# Patient Record
Sex: MALE | Race: WHITE | NOT HISPANIC OR LATINO
[De-identification: names, ages, dates, MRNs, and addresses within clinical notes are randomized per-mention and may not be internally consistent; named-entity substitution may affect disease eponyms.]

---

## 2018-08-08 ENCOUNTER — HOSPITAL (OUTPATIENT)
Dept: OTHER | Age: 53
End: 2018-08-08
Attending: EMERGENCY MEDICINE

## 2021-05-13 ENCOUNTER — OFFICE VISIT (OUTPATIENT)
Dept: URGENT CARE | Facility: URGENT CARE | Age: 56
End: 2021-05-13
Payer: COMMERCIAL

## 2021-05-13 VITALS
OXYGEN SATURATION: 98 % | WEIGHT: 170 LBS | BODY MASS INDEX: 23.8 KG/M2 | HEIGHT: 71 IN | DIASTOLIC BLOOD PRESSURE: 87 MMHG | RESPIRATION RATE: 16 BRPM | TEMPERATURE: 98.5 F | SYSTOLIC BLOOD PRESSURE: 129 MMHG | HEART RATE: 97 BPM

## 2021-05-13 DIAGNOSIS — H00.015 HORDEOLUM EXTERNUM OF LEFT LOWER EYELID: Primary | ICD-10-CM

## 2021-05-13 PROCEDURE — 99203 OFFICE O/P NEW LOW 30 MIN: CPT | Performed by: PHYSICIAN ASSISTANT

## 2021-05-13 RX ORDER — ERYTHROMYCIN 5 MG/G
0.5 OINTMENT OPHTHALMIC AT BEDTIME
COMMUNITY
End: 2021-12-15

## 2021-05-13 RX ORDER — TOBRAMYCIN AND DEXAMETHASONE 3; 1 MG/ML; MG/ML
1-2 SUSPENSION/ DROPS OPHTHALMIC EVERY 4 HOURS
Qty: 10 ML | Refills: 0 | Status: SHIPPED | OUTPATIENT
Start: 2021-05-13 | End: 2021-12-15

## 2021-05-13 ASSESSMENT — MIFFLIN-ST. JEOR: SCORE: 1618.24

## 2021-05-13 NOTE — PATIENT INSTRUCTIONS
Patient Education     Sty (or Stye)  A sty is when the oil gland of the eyelid becomes inflamed. It may develop into an infection with a small pocket of pus (an abscess). This can cause pain, redness, and swelling. In early stages, a sty is treated with antibiotic cream, eye drops, or a small towel soaked in warm water (a warm compress). More severe cases may need to be opened and drained by a healthcare provider.   Home care    Eye drops or ointment are often prescribed to treat the infection. Use these as directed.     Artificial tears may also be used to lubricate the eye and make it more comfortable. You can buy these over the counter without a prescription. Talk with your healthcare provider before using any over-the-counter treatment for a sty.    Apply a warm, damp towel to the affected area for at least 5 minutes, 3 to 4 times a day for a week. Warm compresses open the pores and speed the healing. Make sure the compresses are not too hot, as they may burn your eyelid.    Sometimes the sty will drain with this treatment alone. If this happens, keep using the antibiotic until all the redness and swelling are gone.    Wash your hands before and after touching the infected eyelid.    Don t squeeze or try to break open the sty.    Follow-up care  Follow up with your healthcare provider, or as advised.   When to seek medical advice  Call your healthcare provider or seek medical care right away if any of these occur:     Increase in swelling or redness around the eyelid after 48 to 72 hours    Increase in eye pain or the eyelid blisters    Increase in warmth--the eyelid feels hot    Drainage of blood or thick pus from the sty    Blister on the eyelid    Inability to open the eyelid due to swelling    Fever of 100.4 F (38 C) or above, or as directed by your provider    Vision changes    Headache or stiff neck    The sty comes back  [employer] last reviewed this educational content on 6/1/2020 2000-2021 [employer]. All rights reserved. This information is not intended as a substitute for professional medical care. Always follow your healthcare professional's instructions.

## 2021-05-13 NOTE — PROGRESS NOTES
Hordeolum externum of left lower eyelid  - tobramycin-dexamethasone (TOBRADEX) 0.3-0.1 % ophthalmic suspension; Place 1-2 drops Into the left eye every 4 hours    20 minutes spent on the date of the encounter doing chart review, history and exam, documentation and further activities per the note     See Patient Instructions  Patient Instructions     Patient Education     Sty (or Stye)  A sty is when the oil gland of the eyelid becomes inflamed. It may develop into an infection with a small pocket of pus (an abscess). This can cause pain, redness, and swelling. In early stages, a sty is treated with antibiotic cream, eye drops, or a small towel soaked in warm water (a warm compress). More severe cases may need to be opened and drained by a healthcare provider.   Home care    Eye drops or ointment are often prescribed to treat the infection. Use these as directed.     Artificial tears may also be used to lubricate the eye and make it more comfortable. You can buy these over the counter without a prescription. Talk with your healthcare provider before using any over-the-counter treatment for a sty.    Apply a warm, damp towel to the affected area for at least 5 minutes, 3 to 4 times a day for a week. Warm compresses open the pores and speed the healing. Make sure the compresses are not too hot, as they may burn your eyelid.    Sometimes the sty will drain with this treatment alone. If this happens, keep using the antibiotic until all the redness and swelling are gone.    Wash your hands before and after touching the infected eyelid.    Don t squeeze or try to break open the sty.    Follow-up care  Follow up with your healthcare provider, or as advised.   When to seek medical advice  Call your healthcare provider or seek medical care right away if any of these occur:     Increase in swelling or redness around the eyelid after 48 to 72 hours    Increase in eye pain or the eyelid blisters    Increase in warmth--the  eyelid feels hot    Drainage of blood or thick pus from the sty    Blister on the eyelid    Inability to open the eyelid due to swelling    Fever of 100.4 F (38 C) or above, or as directed by your provider    Vision changes    Headache or stiff neck    The sty comes back  [employer] last reviewed this educational content on 6/1/2020 2000-2021 [employer]. All rights reserved. This information is not intended as a substitute for professional medical care. Always follow your healthcare professional's instructions.               Jagdish Narayanan PA-C  [facility]    Subjective   56 year old year old who presents to clinic today for the following health issues:    Urgent Care and Eye Problem       HPI     Eye(s) Problem  Onset/Duration: 4 days ago  Description:   Location: Left eye bottom lid  Pain: YES  Redness: YES  Accompanying Signs & Symptoms:  Discharge/mattering: YES  Swelling: YES  Visual changes: no  Fever: no  Nasal Congestion: no  Bothered by bright lights: no  History:  Trauma: no  Foreign body exposure: no  Wearing contacts: no  Precipitating or alleviating factors: None  Therapies tried and outcome: None      Review of Systems   Review of Systems   See HPI     Objective    Temp: 98.5  F (36.9  C)   BP: 129/87 Pulse: 97   Resp: 16 SpO2: 98 %       Physical Exam   Physical Exam  Constitutional:       General: He is not in acute distress.     Appearance: Normal appearance. He is normal weight. He is not ill-appearing or toxic-appearing.   HENT:      Head: Normocephalic and atraumatic.      Right Ear: Tympanic membrane, ear canal and external ear normal. There is no impacted cerumen.      Left Ear: Tympanic membrane, ear canal and external ear normal. There is no impacted cerumen.      Nose: Nose normal. No congestion or rhinorrhea.      Mouth/Throat:      Mouth: Mucous membranes are moist.      Pharynx: Oropharynx is clear. No oropharyngeal exudate or posterior oropharyngeal  erythema.   Eyes:      General: No allergic shiner.        Right eye: No foreign body, discharge or hordeolum.         Left eye: Discharge and hordeolum present.No foreign body.      Extraocular Movements: Extraocular movements intact.      Conjunctiva/sclera: Conjunctivae normal.      Pupils: Pupils are equal, round, and reactive to light.     Neck:      Musculoskeletal: Normal range of motion. No muscular tenderness.   Cardiovascular:      Rate and Rhythm: Normal rate and regular rhythm.      Pulses: Normal pulses.      Heart sounds: Normal heart sounds. No murmur. No friction rub. No gallop.    Pulmonary:      Effort: Pulmonary effort is normal. No respiratory distress.      Breath sounds: Normal breath sounds. No stridor. No wheezing, rhonchi or rales.   Chest:      Chest wall: No tenderness.   Lymphadenopathy:      Cervical: No cervical adenopathy.   Neurological:      General: No focal deficit present.      Mental Status: He is alert and oriented to person, place, and time. Mental status is at baseline.      Gait: Gait normal.   Psychiatric:         Mood and Affect: Mood normal.         Behavior: Behavior normal.         Thought Content: Thought content normal.         Judgment: Judgment normal.

## 2021-05-17 NOTE — TELEPHONE ENCOUNTER
FUTURE VISIT INFORMATION      FUTURE VISIT INFORMATION:    Date: 5/18/21    Time: 2:00pm    [place] [facility] [place]  REFERRAL INFORMATION:    Referring provider:  self    Referring providers clinic:  N/A    Reason for visit/diagnosis  Chalazion Consult    RECORDS REQUESTED FROM:       No recs to collect per Pt

## 2021-05-18 ENCOUNTER — OFFICE VISIT (OUTPATIENT)
Dept: OPHTHALMOLOGY | Facility: CLINIC | Age: 56
End: 2021-05-18
Payer: COMMERCIAL

## 2021-05-18 ENCOUNTER — PRE VISIT (OUTPATIENT)
Dept: OPHTHALMOLOGY | Facility: CLINIC | Age: 56
End: 2021-05-18

## 2021-05-18 DIAGNOSIS — H00.15 CHALAZION OF LEFT LOWER EYELID: Primary | ICD-10-CM

## 2021-05-18 PROCEDURE — 99203 OFFICE O/P NEW LOW 30 MIN: CPT | Mod: 25 | Performed by: OPHTHALMOLOGY

## 2021-05-18 PROCEDURE — 67800 REMOVE EYELID LESION: CPT | Mod: E2 | Performed by: OPHTHALMOLOGY

## 2021-05-18 RX ORDER — ERYTHROMYCIN 5 MG/G
OINTMENT OPHTHALMIC ONCE
Status: COMPLETED | OUTPATIENT
Start: 2021-05-18 | End: 2021-05-18

## 2021-05-18 RX ADMIN — ERYTHROMYCIN: 5 OINTMENT OPHTHALMIC at 15:07

## 2021-05-18 ASSESSMENT — TONOMETRY
IOP_METHOD: ICARE
OS_IOP_MMHG: 14
OD_IOP_MMHG: 13

## 2021-05-18 ASSESSMENT — CONF VISUAL FIELD
OS_NORMAL: 1
OD_NORMAL: 1

## 2021-05-18 ASSESSMENT — VISUAL ACUITY
METHOD: SNELLEN - LINEAR
CORRECTION_TYPE: GLASSES
OD_CC: 20/20
OS_CC: 20/30

## 2021-05-18 NOTE — PROGRESS NOTES
"Oculoplastic Clinic New Patient    Patient: Juan J Turner MRN# [id number]   YOB: 1965 Age: 56 year old   Date of Visit: May 18, 2021    CC: Eyelid lesion    Chief Complaint(s) and History of Present Illness(es)     Chalazion Evaluation     Laterality: left lower lid    Onset: 2 weeks ago    Associated symptoms: lid swelling, lid pain, lid redness, discharge and   mattering.  Negative for eye pain and bleeding    Treatments tried: warm compresses and antibiotic drops    Response to treatment: no improvement        Comments     Pt notes that he has a h/o chalazions always in the LE (4 times before),   this time is the bottom lid, started getting bigger on the outside of the   LLL x 2 days ago, had discharge and the bump \"popped\" in the inside on   Sunday. Has some \"mild, sharp pain\" when blinking in the LE. Has been   using WC x 2 weeks, QID for 10 minutes, has the \"one that you heat up in   the microwave\" per pt.  Pt went to [facility] and received tobramycin/dex QID LE.     Jenny Hancock COT May 18, 2021 2:39 PM          HPI:     Juan J Turner is a 56 year old male who has noted a lesion on the left lower eyelid. It has been present for last 10 days. Similar to previous chalazions on the left lower eyelid. It appears red, swollen, and did drain like an abscess approximately 2 days ago. No further drainage and appears to have developed again. Mild pain with palpation and forceful blinking. Not obstructing vision at this time. This has not been biopsied. He has had previous chalazion drained with resolution. He also has had previous steroid injection with some relief. Has been performing warm compresses.     Enlarging: Yes  Irritating to eyelashes and catches in folds of skin: No  Bleeding: No  Prior cutaneous malignancy: No  Immunosuppression: No, no medications  No blood thinners.            Assessment and Plan:     Encounter Diagnosis   Name Primary?     Chalazion of left lower eyelid Yes "   Discussed options, plan incision and drainage today.    OPERATIVE NOTE: CHALAZION.    PRE-OPERATIVE DIAGNOSIS: Chalazion left lower lid.    POST-OPERATIVE DIAGNOSIS: Chalazion left lower lid.    PROCEDURE PERFORMED: Incision and drainage of chalazion left lower lid.    SURGEON: Arianne Morrow    ANESTHESIA: Local infiltration with 2% lidocaine with epinephrine.    COMPLICATIONS: None    ESTIMATED BLOOD LOSS:  <5mL    HISTORY AND INDICATIONS: Patient presented with an enlarging chalazion in the involved eyelid.  This failed conservative medical management.  After the risks, benefits and alternatives of the proposed procedure were explained, informed consent was obtained.     PROCEDURE: Patient was brought to the minor procedure room and placed supine on the operating table.  Anesthesia was as listed above.  The area was prepped and draped in the typical fashion.  A chalazion clamp was placed over the involved eyelid and the eyelid everted.  A crutiate incision was made over the chalazion and the lipogranulomatous material removed using the chalazion curette.  Scissors were used to break any septae.  The edges of the cruciate incision were excised using Mustapha scissors.  Hemostasis was obtained.  The lid was reverted to its normal position, the clamp removed, and the erythromycin antibiotic ointment applied.  The patient tolerated the procedure well and left in stable condition with a tube of antibiotic ointment.     I was present for the entire procedure. Arianne Morrow MD          Attending Physician Attestation: Complete documentation of historical and exam elements from today's encounter can be found in the full encounter summary report (not reduplicated in this progress note). I personally obtained the chief complaint(s) and history of present illness. I confirmed and edited as necessary the review of systems, past medical/surgical history, family history, social history, and examination findings as  documented by others; and I examined the patient myself. I personally reviewed the relevant tests, images, and reports as documented above. I formulated and edited as necessary the assessment and plan and discussed the findings and management plan with the patient. Arianne Morrow MD    Today with Juan J Turner, I reviewed the indications, risks, benefits, and alternatives of the proposed surgical procedure including, but not limited to, failure obtain the desired result  and need for additional surgery, bleeding, infection, loss of vision, injury to the eye, and the remote possibility of permanent damage to any organ system or death with the use of anesthesia.  I provided multiple opportunities for the questions, answered all questions to the best of my ability, and confirmed that my answers and my discussion were understood. Arianne Morrow MD

## 2021-05-18 NOTE — LETTER
"5/18/2021       RE: Juan J Turner  [address]  [place] [facility] [address]     Dear Colleague,    Thank you for referring your patient, Juan J Turner, to the [facility] [place] [facility]. Please see a copy of my visit note below.    Oculoplastic Clinic New Patient    Patient: Juan J Turner MRN# [id number]   YOB: 1965 Age: 56 year old   Date of Visit: May 18, 2021    CC: Eyelid lesion    Chief Complaint(s) and History of Present Illness(es)     Chalazion Evaluation     Laterality: left lower lid    Onset: 2 weeks ago    Associated symptoms: lid swelling, lid pain, lid redness, discharge and   mattering.  Negative for eye pain and bleeding    Treatments tried: warm compresses and antibiotic drops    Response to treatment: no improvement        Comments     Pt notes that he has a h/o chalazions always in the LE (4 times before),   this time is the bottom lid, started getting bigger on the outside of the   LLL x 2 days ago, had discharge and the bump \"popped\" in the inside on   Sunday. Has some \"mild, sharp pain\" when blinking in the LE. Has been   using WC x 2 weeks, QID for 10 minutes, has the \"one that you heat up in   the microwave\" per pt.  Pt went to [facility] and received tobramycin/dex QID LE.     Jenny Hancock COT May 18, 2021 2:39 PM          HPI:     Juan J Turner is a 56 year old male who has noted a lesion on the left lower eyelid. It has been present for last 10 days. Similar to previous chalazions on the left lower eyelid. It appears red, swollen, and did drain like an abscess approximately 2 days ago. No further drainage and appears to have developed again. Mild pain with palpation and forceful blinking. Not obstructing vision at this time. This has not been biopsied. He has had previous chalazion drained with resolution. He also has had previous steroid injection with some relief. Has been " performing warm compresses.     Enlarging: Yes  Irritating to eyelashes and catches in folds of skin: No  Bleeding: No  Prior cutaneous malignancy: No  Immunosuppression: No, no medications  No blood thinners.            Assessment and Plan:     Encounter Diagnosis   Name Primary?     Chalazion of left lower eyelid Yes   Discussed options, plan incision and drainage today.    OPERATIVE NOTE: CHALAZION.    PRE-OPERATIVE DIAGNOSIS: Chalazion left lower lid.    POST-OPERATIVE DIAGNOSIS: Chalazion left lower lid.    PROCEDURE PERFORMED: Incision and drainage of chalazion left lower lid.    SURGEON: Arianne Morrow    ANESTHESIA: Local infiltration with 2% lidocaine with epinephrine.    COMPLICATIONS: None    ESTIMATED BLOOD LOSS:  <5mL    HISTORY AND INDICATIONS: Patient presented with an enlarging chalazion in the involved eyelid.  This failed conservative medical management.  After the risks, benefits and alternatives of the proposed procedure were explained, informed consent was obtained.     PROCEDURE: Patient was brought to the minor procedure room and placed supine on the operating table.  Anesthesia was as listed above.  The area was prepped and draped in the typical fashion.  A chalazion clamp was placed over the involved eyelid and the eyelid everted.  A crutiate incision was made over the chalazion and the lipogranulomatous material removed using the chalazion curette.  Scissors were used to break any septae.  The edges of the cruciate incision were excised using Mustapha scissors.  Hemostasis was obtained.  The lid was reverted to its normal position, the clamp removed, and the erythromycin antibiotic ointment applied.  The patient tolerated the procedure well and left in stable condition with a tube of antibiotic ointment.     I was present for the entire procedure. Arianne Morrow MD          Attending Physician Attestation: Complete documentation of historical and exam elements from today's encounter  can be found in the full encounter summary report (not reduplicated in this progress note). I personally obtained the chief complaint(s) and history of present illness. I confirmed and edited as necessary the review of systems, past medical/surgical history, family history, social history, and examination findings as documented by others; and I examined the patient myself. I personally reviewed the relevant tests, images, and reports as documented above. I formulated and edited as necessary the assessment and plan and discussed the findings and management plan with the patient. Arianne Morrow MD    Today with Juan J Turner, I reviewed the indications, risks, benefits, and alternatives of the proposed surgical procedure including, but not limited to, failure obtain the desired result  and need for additional surgery, bleeding, infection, loss of vision, injury to the eye, and the remote possibility of permanent damage to any organ system or death with the use of anesthesia.  I provided multiple opportunities for the questions, answered all questions to the best of my ability, and confirmed that my answers and my discussion were understood. Arianne Morrow MD        Again, thank you for allowing me to participate in the care of your patient.      Sincerely,    Arianne Morrow MD

## 2021-05-18 NOTE — NURSING NOTE
"Chief Complaints and History of Present Illnesses   Patient presents with     Chalazion Evaluation     Chief Complaint(s) and History of Present Illness(es)     Chalazion Evaluation     Laterality: left lower lid    Onset: 2 weeks ago    Associated symptoms: lid swelling, lid pain, lid redness, discharge and mattering.  Negative for eye pain and bleeding    Treatments tried: warm compresses and antibiotic drops    Response to treatment: no improvement              Comments     Pt notes that he has a h/o chalazions always in the LE (4 times before), this time is the bottom lid, started getting bigger on the outside of the LLL x 2 days ago, had discharge and the bump \"popped\" in the inside on Sunday. Has some \"mild, sharp pain\" when blinking in the LE. Has been using WC x 2 weeks, QID for 10 minutes, has the \"one that you heat up in the microwave\" per pt.  Pt went to [facility] and received tobramycin/dex QID LE.     Jenny Hancock COT May 18, 2021 2:39 PM                  "

## 2021-06-27 ENCOUNTER — HEALTH MAINTENANCE LETTER (OUTPATIENT)
Age: 56
End: 2021-06-27

## 2021-10-17 ENCOUNTER — HEALTH MAINTENANCE LETTER (OUTPATIENT)
Age: 56
End: 2021-10-17

## 2021-11-09 ENCOUNTER — OFFICE VISIT (OUTPATIENT)
Dept: FAMILY MEDICINE | Facility: CLINIC | Age: 56
End: 2021-11-09
Payer: COMMERCIAL

## 2021-11-09 VITALS
OXYGEN SATURATION: 98 % | SYSTOLIC BLOOD PRESSURE: 124 MMHG | TEMPERATURE: 97.7 F | HEIGHT: 71 IN | RESPIRATION RATE: 16 BRPM | WEIGHT: 175 LBS | HEART RATE: 66 BPM | BODY MASS INDEX: 24.5 KG/M2 | DIASTOLIC BLOOD PRESSURE: 76 MMHG

## 2021-11-09 DIAGNOSIS — Z80.42 FAMILY HISTORY OF PROSTATE CANCER IN FATHER: ICD-10-CM

## 2021-11-09 DIAGNOSIS — R53.83 OTHER FATIGUE: ICD-10-CM

## 2021-11-09 DIAGNOSIS — Z23 NEED FOR VACCINATION: ICD-10-CM

## 2021-11-09 DIAGNOSIS — Z12.5 SCREENING FOR PROSTATE CANCER: ICD-10-CM

## 2021-11-09 DIAGNOSIS — Z11.4 SCREENING FOR HIV (HUMAN IMMUNODEFICIENCY VIRUS): ICD-10-CM

## 2021-11-09 DIAGNOSIS — R39.11 URINARY HESITANCY: Primary | ICD-10-CM

## 2021-11-09 DIAGNOSIS — Z11.59 NEED FOR HEPATITIS C SCREENING TEST: ICD-10-CM

## 2021-11-09 DIAGNOSIS — Z23 NEED FOR PROPHYLACTIC VACCINATION AND INOCULATION AGAINST INFLUENZA: ICD-10-CM

## 2021-11-09 LAB
ALBUMIN SERPL-MCNC: 3.8 G/DL (ref 3.4–5)
ALP SERPL-CCNC: 117 U/L (ref 40–150)
ALT SERPL W P-5'-P-CCNC: 82 U/L (ref 0–70)
ANION GAP SERPL CALCULATED.3IONS-SCNC: 8 MMOL/L (ref 3–14)
AST SERPL W P-5'-P-CCNC: 47 U/L (ref 0–45)
BASOPHILS # BLD AUTO: 0 10E3/UL (ref 0–0.2)
BASOPHILS NFR BLD AUTO: 1 %
BILIRUB SERPL-MCNC: 0.7 MG/DL (ref 0.2–1.3)
BUN SERPL-MCNC: 12 MG/DL (ref 7–30)
CALCIUM SERPL-MCNC: 9 MG/DL (ref 8.5–10.1)
CHLORIDE BLD-SCNC: 104 MMOL/L (ref 94–109)
CO2 SERPL-SCNC: 25 MMOL/L (ref 20–32)
CREAT SERPL-MCNC: 0.81 MG/DL (ref 0.66–1.25)
DEPRECATED CALCIDIOL+CALCIFEROL SERPL-MC: 30 UG/L (ref 20–75)
EOSINOPHIL # BLD AUTO: 0.1 10E3/UL (ref 0–0.7)
EOSINOPHIL NFR BLD AUTO: 2 %
ERYTHROCYTE [DISTWIDTH] IN BLOOD BY AUTOMATED COUNT: 12.7 % (ref 10–15)
FERRITIN SERPL-MCNC: 104 NG/ML (ref 26–388)
GFR SERPL CREATININE-BSD FRML MDRD: >90 ML/MIN/1.73M2
GLUCOSE BLD-MCNC: 106 MG/DL (ref 70–99)
HBA1C MFR BLD: 5.3 % (ref 0–5.6)
HCT VFR BLD AUTO: 50.8 % (ref 40–53)
HCV AB SERPL QL IA: NONREACTIVE
HGB BLD-MCNC: 17.1 G/DL (ref 13.3–17.7)
HIV 1+2 AB+HIV1 P24 AG SERPL QL IA: NONREACTIVE
LYMPHOCYTES # BLD AUTO: 1.1 10E3/UL (ref 0.8–5.3)
LYMPHOCYTES NFR BLD AUTO: 29 %
MCH RBC QN AUTO: 31.1 PG (ref 26.5–33)
MCHC RBC AUTO-ENTMCNC: 33.7 G/DL (ref 31.5–36.5)
MCV RBC AUTO: 93 FL (ref 78–100)
MONOCYTES # BLD AUTO: 0.4 10E3/UL (ref 0–1.3)
MONOCYTES NFR BLD AUTO: 10 %
NEUTROPHILS # BLD AUTO: 2.3 10E3/UL (ref 1.6–8.3)
NEUTROPHILS NFR BLD AUTO: 58 %
PLATELET # BLD AUTO: 284 10E3/UL (ref 150–450)
POTASSIUM BLD-SCNC: 4.4 MMOL/L (ref 3.4–5.3)
PROT SERPL-MCNC: 7.8 G/DL (ref 6.8–8.8)
PSA SERPL-MCNC: 3.58 UG/L (ref 0–4)
RBC # BLD AUTO: 5.49 10E6/UL (ref 4.4–5.9)
SODIUM SERPL-SCNC: 137 MMOL/L (ref 133–144)
TSH SERPL DL<=0.005 MIU/L-ACNC: 0.96 MU/L (ref 0.4–4)
VIT B12 SERPL-MCNC: 269 PG/ML (ref 193–986)
WBC # BLD AUTO: 3.9 10E3/UL (ref 4–11)

## 2021-11-09 PROCEDURE — 90472 IMMUNIZATION ADMIN EACH ADD: CPT | Performed by: INTERNAL MEDICINE

## 2021-11-09 PROCEDURE — 82607 VITAMIN B-12: CPT | Performed by: INTERNAL MEDICINE

## 2021-11-09 PROCEDURE — 83036 HEMOGLOBIN GLYCOSYLATED A1C: CPT | Performed by: INTERNAL MEDICINE

## 2021-11-09 PROCEDURE — 90471 IMMUNIZATION ADMIN: CPT | Performed by: INTERNAL MEDICINE

## 2021-11-09 PROCEDURE — 90682 RIV4 VACC RECOMBINANT DNA IM: CPT | Performed by: INTERNAL MEDICINE

## 2021-11-09 PROCEDURE — 86803 HEPATITIS C AB TEST: CPT | Performed by: INTERNAL MEDICINE

## 2021-11-09 PROCEDURE — 36415 COLL VENOUS BLD VENIPUNCTURE: CPT | Performed by: INTERNAL MEDICINE

## 2021-11-09 PROCEDURE — 87389 HIV-1 AG W/HIV-1&-2 AB AG IA: CPT | Performed by: INTERNAL MEDICINE

## 2021-11-09 PROCEDURE — 82728 ASSAY OF FERRITIN: CPT | Performed by: INTERNAL MEDICINE

## 2021-11-09 PROCEDURE — 82306 VITAMIN D 25 HYDROXY: CPT | Performed by: INTERNAL MEDICINE

## 2021-11-09 PROCEDURE — 99203 OFFICE O/P NEW LOW 30 MIN: CPT | Mod: 25 | Performed by: INTERNAL MEDICINE

## 2021-11-09 PROCEDURE — 90715 TDAP VACCINE 7 YRS/> IM: CPT | Performed by: INTERNAL MEDICINE

## 2021-11-09 PROCEDURE — G0103 PSA SCREENING: HCPCS | Performed by: INTERNAL MEDICINE

## 2021-11-09 PROCEDURE — 80050 GENERAL HEALTH PANEL: CPT | Performed by: INTERNAL MEDICINE

## 2021-11-09 RX ORDER — HYDROCORTISONE 2.5 %
CREAM (GRAM) TOPICAL
COMMUNITY
Start: 2021-05-01 | End: 2021-12-15

## 2021-11-09 ASSESSMENT — MIFFLIN-ST. JEOR: SCORE: 1640.92

## 2021-11-09 NOTE — NURSING NOTE
Prior to immunization administration, verified patients identity using patient s name and date of birth. Please see Immunization Activity for additional information.     Screening Questionnaire for Adult Immunization    Are you sick today?   No   Do you have allergies to medications, food, a vaccine component or latex?   No   Have you ever had a serious reaction after receiving a vaccination?   No   Do you have a long-term health problem with heart, lung, kidney, or metabolic disease (e.g., diabetes), asthma, a blood disorder, no spleen, complement component deficiency, a cochlear implant, or a spinal fluid leak?  Are you on long-term aspirin therapy?   No   Do you have cancer, leukemia, HIV/AIDS, or any other immune system problem?   No   Do you have a parent, brother, or sister with an immune system problem?   No   In the past 3 months, have you taken medications that affect  your immune system, such as prednisone, other steroids, or anticancer drugs; drugs for the treatment of rheumatoid arthritis, Crohn s disease, or psoriasis; or have you had radiation treatments?   No   Have you had a seizure, or a brain or other nervous system problem?   No   During the past year, have you received a transfusion of blood or blood    products, or been given immune (gamma) globulin or antiviral drug?   No   For women: Are you pregnant or is there a chance you could become       pregnant during the next month?   No   Have you received any vaccinations in the past 4 weeks?   No     Immunization questionnaire answers were all negative.        Per orders of Dr. Zamora, injection of Tdap given by Marbella Cabezas CMA. Patient instructed to remain in clinic for 15 minutes afterwards, and to report any adverse reaction to me immediately.       Screening performed by Marbella Cabezas CMA on 11/9/2021 at 11:39 AM.

## 2021-11-09 NOTE — PROGRESS NOTES
"  Assessment & Plan     Urinary hesitancy  Likely BPH related, chronic in nature. He defers UA to next visit if needed. Will check PSA given family hx and due for routine screening.  - Prostate Specific Antigen Screen; Future    Other fatigue  Check relevant labs  - CBC with Platelets & Differential; Future  - Comprehensive metabolic panel; Future  - Hemoglobin A1c; Future  - TSH with free T4 reflex; Future  - Vitamin B12; Future  - Vitamin D Deficiency; Future  - Ferritin; Future    Family history of prostate cancer in father  - Prostate Specific Antigen Screen; Future    Screening for prostate cancer  - Prostate Specific Antigen Screen; Future    Need for hepatitis C screening test  - Hepatitis C antibody; Future    Screening for HIV (human immunodeficiency virus)  - HIV Antigen Antibody Combo; Future      Return in about 1 month (around 2021) for Routine preventive, with me, in person, sooner if symptoms worsen or do not improve.    DO PEGGY Shaffer [facility] KENYA Arita is a 56 year old who presents for the following health issues    HPI     Former PCP: none  Specialists: ophthalmologist for recurrent chalazions  Problem list and medications reviewed and updated. See below for additional notes.  Social: nonsmoker, occasional etoh  HM: flu and tdap due-he is agreeable    Dad had prostate cancer in his 60s.  w/ Alzheimer's   Patient had a prostate exam around age 49.   No pain with urination.  No increased urination.   Slightly slower stream. Not new.  Admits some fatigue-interested in updating labs.  Due for physical, plans to schedule next month fasting.    Review of Systems   Constitutional, HEENT, cardiovascular, pulmonary, gi and gu systems are negative, except as otherwise noted.      Objective    /76 (BP Location: Right arm, Patient Position: Sitting, Cuff Size: Adult Large)   Pulse 66   Temp 97.7  F (36.5  C) (Temporal)   Resp 16   Ht 1.803 m (5' 11\")   Wt " 79.4 kg (175 lb)   SpO2 98%   BMI 24.41 kg/m    Body mass index is 24.41 kg/m .  Physical Exam     GENERAL APPEARANCE: AAOx3, no distress. Well developed.    PSYCH: appropriate mood and affect.

## 2021-12-15 ENCOUNTER — OFFICE VISIT (OUTPATIENT)
Dept: FAMILY MEDICINE | Facility: CLINIC | Age: 56
End: 2021-12-15
Payer: COMMERCIAL

## 2021-12-15 VITALS
DIASTOLIC BLOOD PRESSURE: 80 MMHG | SYSTOLIC BLOOD PRESSURE: 107 MMHG | TEMPERATURE: 97.5 F | RESPIRATION RATE: 16 BRPM | HEART RATE: 75 BPM | HEIGHT: 71 IN | OXYGEN SATURATION: 97 % | WEIGHT: 172 LBS | BODY MASS INDEX: 24.08 KG/M2

## 2021-12-15 DIAGNOSIS — Z00.00 ROUTINE HISTORY AND PHYSICAL EXAMINATION OF ADULT: Primary | ICD-10-CM

## 2021-12-15 DIAGNOSIS — D72.819 LEUKOPENIA, UNSPECIFIED TYPE: ICD-10-CM

## 2021-12-15 DIAGNOSIS — L40.9 PSORIASIS: ICD-10-CM

## 2021-12-15 DIAGNOSIS — R79.89 ELEVATED LFTS: ICD-10-CM

## 2021-12-15 DIAGNOSIS — K64.4 EXTERNAL HEMORRHOIDS: ICD-10-CM

## 2021-12-15 LAB
ALBUMIN SERPL-MCNC: 3.7 G/DL (ref 3.4–5)
ALP SERPL-CCNC: 86 U/L (ref 40–150)
ALT SERPL W P-5'-P-CCNC: 67 U/L (ref 0–70)
AST SERPL W P-5'-P-CCNC: 35 U/L (ref 0–45)
BILIRUB DIRECT SERPL-MCNC: 0.1 MG/DL (ref 0–0.2)
BILIRUB SERPL-MCNC: 0.6 MG/DL (ref 0.2–1.3)
GGT SERPL-CCNC: 131 U/L (ref 0–75)
HBV SURFACE AG SERPL QL IA: NONREACTIVE
PROT SERPL-MCNC: 7.5 G/DL (ref 6.8–8.8)
WBC # BLD AUTO: 4.3 10E3/UL (ref 4–11)

## 2021-12-15 PROCEDURE — 85048 AUTOMATED LEUKOCYTE COUNT: CPT | Performed by: INTERNAL MEDICINE

## 2021-12-15 PROCEDURE — 99396 PREV VISIT EST AGE 40-64: CPT | Performed by: INTERNAL MEDICINE

## 2021-12-15 PROCEDURE — 36415 COLL VENOUS BLD VENIPUNCTURE: CPT | Performed by: INTERNAL MEDICINE

## 2021-12-15 PROCEDURE — 87340 HEPATITIS B SURFACE AG IA: CPT | Performed by: INTERNAL MEDICINE

## 2021-12-15 PROCEDURE — 80076 HEPATIC FUNCTION PANEL: CPT | Performed by: INTERNAL MEDICINE

## 2021-12-15 PROCEDURE — 80061 LIPID PANEL: CPT | Performed by: INTERNAL MEDICINE

## 2021-12-15 PROCEDURE — 82977 ASSAY OF GGT: CPT | Performed by: INTERNAL MEDICINE

## 2021-12-15 RX ORDER — FLUOCINOLONE ACETONIDE 0.1 MG/ML
SOLUTION TOPICAL 2 TIMES DAILY
COMMUNITY

## 2021-12-15 RX ORDER — HYDROCORTISONE 2.5 %
CREAM (GRAM) TOPICAL
Qty: 20 G | Refills: 1 | Status: SHIPPED | OUTPATIENT
Start: 2021-12-15 | End: 2022-01-05

## 2021-12-15 ASSESSMENT — MIFFLIN-ST. JEOR: SCORE: 1627.32

## 2021-12-15 ASSESSMENT — PAIN SCALES - GENERAL: PAINLEVEL: NO PAIN (0)

## 2021-12-15 NOTE — PROGRESS NOTES
SUBJECTIVE:   CC: Juan J Turner is an 56 year old male who presents for preventative health visit.       Patient has been advised of split billing requirements and indicates understanding: Yes  Healthy Habits:     Taking medications regularly:  0    PHQ-2 Total Score: 0  History of Present Illness       He eats 0-1 servings of fruits and vegetables daily.He consumes 0 sweetened beverage(s) daily.He exercises with enough effort to increase his heart rate 20 to 29 minutes per day.  He exercises with enough effort to increase his heart rate 5 days per week.   He is taking medications regularly.        Today's PHQ-2 Score:   PHQ-2 ( 1999 [employer]) 12/15/2021   Q1: Little interest or pleasure in doing things 0   Q2: Feeling down, depressed or hopeless 0   PHQ-2 Score 0   Q1: Little interest or pleasure in doing things -   Q2: Feeling down, depressed or hopeless -   PHQ-2 Score -       Abuse: Current or Past(Physical, Sexual or Emotional)- No  Do you feel safe in your environment? Yes    Have you ever done Advance Care Planning? (For example, a Health Directive, POLST, or a discussion with a medical provider or your loved ones about your wishes): No, advance care planning information given to patient to review.  Patient plans to discuss their wishes with loved ones or provider.      Social History     Tobacco Use     Smoking status: Never Smoker     Smokeless tobacco: Never Used   Substance Use Topics     Alcohol use: Yes     If you drink alcohol do you typically have >3 drinks per day or >7 drinks per week? No    Alcohol Use 12/15/2021   Prescreen: >3 drinks/day or >7 drinks/week? No       Last PSA:   Prostate Specific Antigen Screen   Date Value Ref Range Status   11/09/2021 3.58 0.00 - 4.00 ug/L Final         Reviewed and updated as needed this visit by clinical staff  Tobacco   Meds  Problems  Med Hx  Surg Hx          Reviewed and updated as needed this visit by Provider               Social: nonsmoker, 1  "drink a day on average  Significant FHx: father-prostate ca; mother-breast ca  Exercise/Diet: as per above    Colonoscopy: utd  Hep C screening: utd  HIV Screening: utd  PSA (male): utd    Flu: utd  Tdap: utd  COVID: UTD    He requests refill on hydrocortisone cream, uses prn for external hemorrhoids (chronic, longstanding per patient), does not use daily. No significant bleeding, intermittent.   Cscope done age 49, reports was normal.    Discussed abnormal labs last visit-denies abd pain, nausea, vomiting.  Fatigue improving, exercising.     Review of Systems  10 point ROS of systems including Constitutional, Eyes, Respiratory, Cardiovascular, Gastroenterology, Genitourinary, Integumentary, Muscularskeletal, Psychiatric were all negative except for pertinent positives noted in my HPI.      OBJECTIVE:   /80 (BP Location: Right arm, Patient Position: Chair, Cuff Size: Adult Large)   Pulse 75   Temp 97.5  F (36.4  C) (Tympanic)   Resp 16   Ht 1.803 m (5' 11\")   Wt 78 kg (172 lb)   SpO2 97%   BMI 23.99 kg/m      Physical Exam    GENERAL APPEARANCE: AAOx3, no distress. Well developed.      NECK: Supple without adenopathy, Trachea is midline. No masses palpated.    RESP: Lungs CTA bilaterally. No w/r/r. No distress     CV: RRR, S1/S2 present. No m/r/c.     ABDOMEN:  soft, nontender, no distention. No rebound or guarding.     EXT: No c/c/e in lower extremities b/l. No rashes or deformities noted.    MSK: ROM and strength intact in four extremities. No gross deformities noted.    SKIN: no suspicious lesions or rashes    NEURO: AAOx3, Motor function intact w/ 5/5 strength bilaterally to UE and LE. Sensation intact bilaterally. Speech is fluent and comprehension intact. No gait abnormalities noted.    PSYCH: appropriate mood and affect.       ASSESSMENT/PLAN:   Juan J was seen today for physical.    Diagnoses and all orders for this visit:    Routine history and physical examination of adult  PSA UTD  Cscope " "UTD  Check lipids, fasting:  -     Lipid panel reflex to direct LDL Fasting; Future    External hemorrhoids  Refilled prn hydrocortisone.   Add preparin H wipes prn if needed for itching.  If worsening or not improving, RTC or consider seeing colorectal specialist  -     hydrocortisone 2.5 % cream; APPLY TO AFFECTED AREA EVERY DAY AS NEEDED    Psoriasis   Recommend follow-up with derm for ongoing management of rx.    Leukopenia, unspecified type  Recheck for normalization  -     WBC count; Future    Elevated LFTs  Recheck for resolution, asymptomatic; denies heavy etoh use.  -     Hepatic panel (Albumin, ALT, AST, Bili, Alk Phos, TP); Future  -     GGT; Future  -     Hepatitis B surface antigen; Future    COUNSELING:   Reviewed preventive health counseling, as reflected in patient instructions       Regular exercise       Healthy diet/nutrition    Estimated body mass index is 23.99 kg/m  as calculated from the following:    Height as of this encounter: 1.803 m (5' 11\").    Weight as of this encounter: 78 kg (172 lb).         He reports that he has never smoked. He has never used smokeless tobacco.      Counseling Resources:  ATP IV Guidelines  Pooled Cohorts Equation Calculator  FRAX Risk Assessment  ICSI Preventive Guidelines  Dietary Guidelines for Americans, 2010  USDA's MyPlate  ASA Prophylaxis  Lung CA Screening    DO PEGGY Shaffer [facility]  "

## 2021-12-17 LAB
CHOLEST SERPL-MCNC: 187 MG/DL
FASTING STATUS PATIENT QL REPORTED: YES
HDLC SERPL-MCNC: 70 MG/DL
LDLC SERPL CALC-MCNC: 102 MG/DL
NONHDLC SERPL-MCNC: 117 MG/DL
TRIGL SERPL-MCNC: 76 MG/DL

## 2021-12-26 DIAGNOSIS — K64.4 EXTERNAL HEMORRHOIDS: ICD-10-CM

## 2021-12-26 NOTE — TELEPHONE ENCOUNTER
Pharmacy calling, pt picked up the refill of hydrocortisone cream and is requesting to have another refill sent.      RN gave the pharmacy the clinic fax # for [place] as requested.      Mary Robison RN  [facility] Nurse Advisor

## 2022-01-04 NOTE — TELEPHONE ENCOUNTER
Original request came in on 12- from patient to [facility] clinic RN.  Was routed to [facility] [place] and has been unprocessed.      Multiple requests from pharmacy received since patient request.    Please process ASAP.  Thank you.    RT Hien (R)

## 2022-01-05 RX ORDER — HYDROCORTISONE 2.5 %
CREAM (GRAM) TOPICAL
Qty: 20 G | Refills: 1 | Status: SHIPPED | OUTPATIENT
Start: 2022-01-05 | End: 2022-04-22

## 2022-01-05 NOTE — TELEPHONE ENCOUNTER
Routing refill request to provider for review/approval because:  Drug not on the [facility] refill protocol   Ngoc Al RN

## 2022-04-20 DIAGNOSIS — K64.4 EXTERNAL HEMORRHOIDS: ICD-10-CM

## 2022-04-22 RX ORDER — HYDROCORTISONE 2.5 %
CREAM (GRAM) TOPICAL
Qty: 20 G | Refills: 1 | Status: SHIPPED | OUTPATIENT
Start: 2022-04-22

## 2022-04-22 NOTE — TELEPHONE ENCOUNTER
Hydrocortisone Cream  Last Written Prescription Date:  1/5/2022  Last Fill Quantity: 20g,  # refills: 1   Last office visit: 12/15/2021 with prescribing provider:     Future Office Visit:      Routing refill request to provider for review/approval because:  Drug not on the [facility] refill protocol or controlled substance    Prisca F - Registered Nurse  [facility]  Acute and Diagnostic Services

## 2022-10-03 ENCOUNTER — HEALTH MAINTENANCE LETTER (OUTPATIENT)
Age: 57
End: 2022-10-03

## 2023-02-11 ENCOUNTER — HEALTH MAINTENANCE LETTER (OUTPATIENT)
Age: 58
End: 2023-02-11

## 2024-03-09 ENCOUNTER — HEALTH MAINTENANCE LETTER (OUTPATIENT)
Age: 59
End: 2024-03-09

## (undated) RX ORDER — ERYTHROMYCIN 5 MG/G
OINTMENT OPHTHALMIC
Status: DISPENSED
Start: 2021-05-18